# Patient Record
Sex: MALE | Race: WHITE | NOT HISPANIC OR LATINO | Employment: FULL TIME | URBAN - METROPOLITAN AREA
[De-identification: names, ages, dates, MRNs, and addresses within clinical notes are randomized per-mention and may not be internally consistent; named-entity substitution may affect disease eponyms.]

---

## 2024-10-11 ENCOUNTER — ANESTHESIA EVENT (OUTPATIENT)
Dept: PERIOP | Facility: HOSPITAL | Age: 69
End: 2024-10-11
Payer: SELF-PAY

## 2024-10-11 RX ORDER — AMLODIPINE BESYLATE 5 MG/1
5 TABLET ORAL DAILY
COMMUNITY
Start: 2024-04-29 | End: 2025-04-29

## 2024-10-11 RX ORDER — TIRZEPATIDE 2.5 MG/.5ML
INJECTION, SOLUTION SUBCUTANEOUS
COMMUNITY
Start: 2024-10-04 | End: 2024-10-22

## 2024-10-11 RX ORDER — BUPRENORPHINE HYDROCHLORIDE AND NALOXONE HYDROCHLORIDE 5.7; 1.4 MG/1; MG/1
TABLET, ORALLY DISINTEGRATING SUBLINGUAL
COMMUNITY

## 2024-10-11 RX ORDER — RAMIPRIL 10 MG/1
1 CAPSULE ORAL EVERY 24 HOURS
COMMUNITY

## 2024-10-11 NOTE — PRE-PROCEDURE INSTRUCTIONS
Pre-Surgery Instructions:   Medication Instructions    amLODIPine (NORVASC) 5 mg tablet Take day of surgery.    apixaban (ELIQUIS) 5 mg Instructions provided by MD- pt did not receive instructions, will reach out to surgeon's office to see if needs to be held    ramipril (ALTACE) 10 MG capsule Hold day of surgery.    Zepbound 2.5 MG/0.5ML auto-injector Stop taking 7 days prior to surgery.    Zubsolv 5.7-1.4 MG SUBL Pt states he was advised by prescriber to hold 1 day prior to surgery.   Medication instructions for day surgery reviewed. Please use only a sip of water to take your instructed medications. Avoid all over the counter vitamins, supplements and NSAIDS for one week prior to surgery per anesthesia guidelines. Tylenol is ok to take as needed.     You will receive a call one business day prior to surgery with an arrival time and hospital directions. If your surgery is scheduled on a Monday, the hospital will be calling you on the Friday prior to your surgery. If you have not heard from anyone by 8pm, please call the hospital supervisor through the hospital  at [phone]. ([place] [phone] or [place] [phone]).    Do not eat or drink anything after midnight the night before your surgery, including candy, mints, lifesavers, or chewing gum. Do not drink alcohol 24hrs before your surgery. Try not to smoke at least 24hrs before your surgery.       Follow the pre surgery showering instructions as listed in the “My Surgical Experience Booklet” or otherwise provided by your surgeon's office. Do not use a blade to shave the surgical area 1 week before surgery. It is okay to use a clean electric clippers up to 24 hours before surgery. Do not apply any lotions, creams, including makeup, cologne, deodorant, or perfumes after showering on the day of your surgery. Do not use dry shampoo, hair spray, hair gel, or any type of hair products.     No contact lenses, eye make-up, or artificial eyelashes.  Remove nail polish, including gel polish, and any artificial, gel, or acrylic nails if possible. Remove all jewelry including rings and body piercing jewelry.     Wear causal clothing that is easy to take on and off. Consider your type of surgery.    Keep any valuables, jewelry, piercings at home. Please bring any specially ordered equipment (sling, braces) if indicated.    Arrange for a responsible person to drive you to and from the hospital on the day of your surgery. Please confirm the visitor policy for the day of your procedure when you receive your phone call with an arrival time.     Call the surgeon's office with any new illnesses, exposures, or additional questions prior to surgery.    Please reference your “My Surgical Experience Booklet” for additional information to prepare for your upcoming surgery.

## 2024-10-21 PROBLEM — H02.30: Status: ACTIVE | Noted: 2024-10-21

## 2024-10-21 NOTE — H&P
H&P - Plastic Surgery   Name: Joshua Orozco 69 y.o. male I MRN: [id number]  Unit/Bed#:  I Date of Admission: (Not on file)   Date of Service: 10/21/2024 I Hospital Day: 0     Assessment & Plan  Blepharochalasis of upper and lower eyelids  Bilateral upper eyelid blepharoplasty bilateral lower eyelid direct excision of festoons    History of Present Illness   Joshua Orozco is a 69 y.o. male who presents with excessive eyelid skin waiting down on the eye lashes causing a visual field obstruction.  Visual field study showed there was more than 30 degree superior field of vision deficit.  Patient also has excessive lower eyelid skin with prominent festoons which will be excised during blepharoplasty    Review of Systems   All other systems reviewed and are negative.    Historical Information   I have reviewed the patient's PMH, PSH, Social History, Family History, Meds, and Allergies    Objective :       Physical Exam  Examination of the head ears eyes nose and throat is within normal limits heart sounds S1-S2 heard no murmurs or gallops lungs clear abdomen soft extremities are within normal limits bilateral upper eyelids show excessive upper eyelid skin waiting down on the eyelashes bilateral lower eyelids show excessive skin with a prominent festoons    Lab Results: I have reviewed the following results:    Imaging Results Review: No pertinent imaging studies reviewed.      VTE Prophylaxis: Sequential compression device (Venodyne)

## 2024-10-22 ENCOUNTER — HOSPITAL ENCOUNTER (OUTPATIENT)
Facility: HOSPITAL | Age: 69
Setting detail: OUTPATIENT SURGERY
Discharge: HOME | End: 2024-10-22
Attending: PLASTIC SURGERY | Admitting: PLASTIC SURGERY
Payer: SELF-PAY

## 2024-10-22 ENCOUNTER — ANESTHESIA (OUTPATIENT)
Dept: PERIOP | Facility: HOSPITAL | Age: 69
End: 2024-10-22
Payer: SELF-PAY

## 2024-10-22 VITALS
BODY MASS INDEX: 34.36 KG/M2 | TEMPERATURE: 97.1 F | HEART RATE: 68 BPM | OXYGEN SATURATION: 97 % | WEIGHT: 240 LBS | SYSTOLIC BLOOD PRESSURE: 140 MMHG | RESPIRATION RATE: 18 BRPM | HEIGHT: 70 IN | DIASTOLIC BLOOD PRESSURE: 85 MMHG

## 2024-10-22 DIAGNOSIS — H02.30: Primary | ICD-10-CM

## 2024-10-22 RX ORDER — PROPOFOL 10 MG/ML
INJECTION, EMULSION INTRAVENOUS AS NEEDED
Status: DISCONTINUED | OUTPATIENT
Start: 2024-10-22 | End: 2024-10-22

## 2024-10-22 RX ORDER — GLYCOPYRROLATE 0.2 MG/ML
INJECTION INTRAMUSCULAR; INTRAVENOUS AS NEEDED
Status: DISCONTINUED | OUTPATIENT
Start: 2024-10-22 | End: 2024-10-22

## 2024-10-22 RX ORDER — FENTANYL CITRATE/PF 50 MCG/ML
25 SYRINGE (ML) INJECTION
Status: DISCONTINUED | OUTPATIENT
Start: 2024-10-22 | End: 2024-10-22 | Stop reason: HOSPADM

## 2024-10-22 RX ORDER — MAGNESIUM HYDROXIDE 1200 MG/15ML
LIQUID ORAL AS NEEDED
Status: DISCONTINUED | OUTPATIENT
Start: 2024-10-22 | End: 2024-10-22 | Stop reason: HOSPADM

## 2024-10-22 RX ORDER — ONDANSETRON 2 MG/ML
4 INJECTION INTRAMUSCULAR; INTRAVENOUS ONCE AS NEEDED
Status: DISCONTINUED | OUTPATIENT
Start: 2024-10-22 | End: 2024-10-22 | Stop reason: HOSPADM

## 2024-10-22 RX ORDER — ACETAMINOPHEN 10 MG/ML
1000 INJECTION, SOLUTION INTRAVENOUS EVERY 6 HOURS PRN
Status: DISCONTINUED | OUTPATIENT
Start: 2024-10-22 | End: 2024-10-22 | Stop reason: HOSPADM

## 2024-10-22 RX ORDER — FENTANYL CITRATE 50 UG/ML
INJECTION, SOLUTION INTRAMUSCULAR; INTRAVENOUS AS NEEDED
Status: DISCONTINUED | OUTPATIENT
Start: 2024-10-22 | End: 2024-10-22

## 2024-10-22 RX ORDER — SODIUM CHLORIDE, SODIUM LACTATE, POTASSIUM CHLORIDE, CALCIUM CHLORIDE 600; 310; 30; 20 MG/100ML; MG/100ML; MG/100ML; MG/100ML
125 INJECTION, SOLUTION INTRAVENOUS CONTINUOUS
Status: DISCONTINUED | OUTPATIENT
Start: 2024-10-22 | End: 2024-10-22 | Stop reason: HOSPADM

## 2024-10-22 RX ORDER — CEFAZOLIN SODIUM 1 G/50ML
1000 SOLUTION INTRAVENOUS ONCE
Status: COMPLETED | OUTPATIENT
Start: 2024-10-22 | End: 2024-10-22

## 2024-10-22 RX ORDER — BALANCED SALT SOLUTION 6.4; .75; .48; .3; 3.9; 1.7 MG/ML; MG/ML; MG/ML; MG/ML; MG/ML; MG/ML
SOLUTION OPHTHALMIC AS NEEDED
Status: DISCONTINUED | OUTPATIENT
Start: 2024-10-22 | End: 2024-10-22 | Stop reason: HOSPADM

## 2024-10-22 RX ORDER — PHENYLEPHRINE HCL IN 0.9% NACL 1 MG/10 ML
SYRINGE (ML) INTRAVENOUS AS NEEDED
Status: DISCONTINUED | OUTPATIENT
Start: 2024-10-22 | End: 2024-10-22

## 2024-10-22 RX ORDER — CEPHALEXIN 500 MG/1
500 CAPSULE ORAL EVERY 8 HOURS SCHEDULED
Qty: 7 CAPSULE | Refills: 0 | Status: SHIPPED | OUTPATIENT
Start: 2024-10-22 | End: 2024-10-29

## 2024-10-22 RX ORDER — EPHEDRINE SULFATE 50 MG/ML
INJECTION INTRAVENOUS AS NEEDED
Status: DISCONTINUED | OUTPATIENT
Start: 2024-10-22 | End: 2024-10-22

## 2024-10-22 RX ORDER — LIDOCAINE HYDROCHLORIDE AND EPINEPHRINE 10; 10 MG/ML; UG/ML
INJECTION, SOLUTION INFILTRATION; PERINEURAL AS NEEDED
Status: DISCONTINUED | OUTPATIENT
Start: 2024-10-22 | End: 2024-10-22 | Stop reason: HOSPADM

## 2024-10-22 RX ORDER — NEOMYCIN SULFATE, POLYMYXIN B SULFATE, AND DEXAMETHASONE 3.5; 10000; 1 MG/G; [USP'U]/G; MG/G
OINTMENT OPHTHALMIC AS NEEDED
Status: DISCONTINUED | OUTPATIENT
Start: 2024-10-22 | End: 2024-10-22 | Stop reason: HOSPADM

## 2024-10-22 RX ORDER — LIDOCAINE HYDROCHLORIDE 10 MG/ML
INJECTION, SOLUTION EPIDURAL; INFILTRATION; INTRACAUDAL; PERINEURAL AS NEEDED
Status: DISCONTINUED | OUTPATIENT
Start: 2024-10-22 | End: 2024-10-22

## 2024-10-22 RX ORDER — TRAMADOL HYDROCHLORIDE 50 MG/1
50 TABLET ORAL EVERY 6 HOURS PRN
Qty: 20 TABLET | Refills: 0 | Status: SHIPPED | OUTPATIENT
Start: 2024-10-22

## 2024-10-22 RX ORDER — ONDANSETRON 2 MG/ML
INJECTION INTRAMUSCULAR; INTRAVENOUS AS NEEDED
Status: DISCONTINUED | OUTPATIENT
Start: 2024-10-22 | End: 2024-10-22

## 2024-10-22 RX ORDER — TRAMADOL HYDROCHLORIDE 50 MG/1
50 TABLET ORAL EVERY 6 HOURS PRN
Status: DISCONTINUED | OUTPATIENT
Start: 2024-10-22 | End: 2024-10-22 | Stop reason: HOSPADM

## 2024-10-22 RX ADMIN — FENTANYL CITRATE 25 MCG: 50 INJECTION, SOLUTION INTRAMUSCULAR; INTRAVENOUS at 15:44

## 2024-10-22 RX ADMIN — Medication 100 MCG: at 16:18

## 2024-10-22 RX ADMIN — EPHEDRINE SULFATE 10 MG: 50 INJECTION INTRAVENOUS at 16:23

## 2024-10-22 RX ADMIN — FENTANYL CITRATE 50 MCG: 50 INJECTION, SOLUTION INTRAMUSCULAR; INTRAVENOUS at 15:48

## 2024-10-22 RX ADMIN — LIDOCAINE HYDROCHLORIDE 50 MG: 10 SOLUTION INTRAVENOUS at 15:10

## 2024-10-22 RX ADMIN — FENTANYL CITRATE 50 MCG: 50 INJECTION, SOLUTION INTRAMUSCULAR; INTRAVENOUS at 15:50

## 2024-10-22 RX ADMIN — ONDANSETRON 4 MG: 2 INJECTION INTRAMUSCULAR; INTRAVENOUS at 16:52

## 2024-10-22 RX ADMIN — FENTANYL CITRATE 25 MCG: 50 INJECTION, SOLUTION INTRAMUSCULAR; INTRAVENOUS at 17:53

## 2024-10-22 RX ADMIN — FENTANYL CITRATE 25 MCG: 50 INJECTION, SOLUTION INTRAMUSCULAR; INTRAVENOUS at 17:47

## 2024-10-22 RX ADMIN — CEFAZOLIN SODIUM 2000 MG: 1 SOLUTION INTRAVENOUS at 15:10

## 2024-10-22 RX ADMIN — PROPOFOL 200 MG: 10 INJECTION, EMULSION INTRAVENOUS at 15:10

## 2024-10-22 RX ADMIN — FENTANYL CITRATE 25 MCG: 50 INJECTION, SOLUTION INTRAMUSCULAR; INTRAVENOUS at 16:22

## 2024-10-22 RX ADMIN — FENTANYL CITRATE 25 MCG: 50 INJECTION, SOLUTION INTRAMUSCULAR; INTRAVENOUS at 17:50

## 2024-10-22 RX ADMIN — FENTANYL CITRATE 25 MCG: 50 INJECTION, SOLUTION INTRAMUSCULAR; INTRAVENOUS at 17:02

## 2024-10-22 RX ADMIN — SODIUM CHLORIDE, SODIUM LACTATE, POTASSIUM CHLORIDE, AND CALCIUM CHLORIDE: .6; .31; .03; .02 INJECTION, SOLUTION INTRAVENOUS at 15:07

## 2024-10-22 RX ADMIN — ACETAMINOPHEN 1000 MG: 10 INJECTION INTRAVENOUS at 17:36

## 2024-10-22 RX ADMIN — Medication 100 MCG: at 16:04

## 2024-10-22 RX ADMIN — FENTANYL CITRATE 25 MCG: 50 INJECTION, SOLUTION INTRAMUSCULAR; INTRAVENOUS at 17:39

## 2024-10-22 RX ADMIN — TRAMADOL HYDROCHLORIDE 50 MG: 50 TABLET, COATED ORAL at 18:28

## 2024-10-22 RX ADMIN — GLYCOPYRROLATE 0.2 MCG: 0.2 INJECTION, SOLUTION INTRAMUSCULAR; INTRAVENOUS at 15:34

## 2024-10-22 RX ADMIN — FENTANYL CITRATE 25 MCG: 50 INJECTION, SOLUTION INTRAMUSCULAR; INTRAVENOUS at 15:13

## 2024-10-22 NOTE — ANESTHESIA POSTPROCEDURE EVALUATION
Post-Op Assessment Note    CV Status:  Stable  Pain Score: 0    Pain management: adequate       Mental Status:  Alert and awake   Hydration Status:  Euvolemic   PONV Controlled:  Controlled   Airway Patency:  Patent     Post Op Vitals Reviewed: Yes    No anethesia notable event occurred.    Staff: CRNA           Last Filed PACU Vitals:  Vitals Value Taken Time   Temp 97.3 °F (36.3 °C) 10/22/24 1711   Pulse 81 10/22/24 1712   /70 10/22/24 1711   Resp 16 10/22/24 1711   SpO2 96 % 10/22/24 1712   Vitals shown include unfiled device data.    Modified Rom:  No data recorded

## 2024-10-22 NOTE — OP NOTE
OPERATIVE REPORT  PATIENT NAME: Joshua Orozco    :  1955  MRN: [id number]  Pt Location:  OR ROOM 07    SURGERY DATE: 10/22/2024    Surgeons and Role:     * Joshua Delgadillo MD - Primary    Preop Diagnosis:  Blepharochalasis right upper eyelid [H02.31]  Blepharochalasis left upper eyelid [H02.34]  Other localized visual field defect, bilateral [H53.453]  Dermatochalasis of right upper eyelid [H02.831]  Dermatochalasis of left upper eyelid [H02.834]    Post-Op Diagnosis Codes:     * Blepharochalasis right upper eyelid [H02.31]     * Blepharochalasis left upper eyelid [H02.34]     * Other localized visual field defect, bilateral [H53.453]     * Dermatochalasis of right upper eyelid [H02.831]     * Dermatochalasis of left upper eyelid [H02.834]    Procedure(s):  Bilateral - BLEPHAROPLASTY UPPER  Bilateral - BLEPHAROPLASTY LOWER    Specimen(s):  * No specimens in log *    Estimated Blood Loss:   Minimal    Drains:  * No LDAs found *    Anesthesia Type:   General/LMA    Operative Indications:  Blepharochalasis right upper eyelid [H02.31]  Blepharochalasis left upper eyelid [H02.34]  Other localized visual field defect, bilateral [H53.453]  Dermatochalasis of right upper eyelid [H02.831]  Dermatochalasis of left upper eyelid [H02.834]  Bilateral lower eyelid stones    Operative Findings:  Excessive upper eyelid skin and fat lower eyelid muscle and skin and fat excess      Complications:   None    Procedure and Technique:  Patient was draped and prepped in normal sterile fashion after general anesthesia.  The upper eyelids were marked for excision of excessive upper eyelid skin and the lower eyelids were marked for excision directly of festoons incision following the contour of the infraorbital ridge.  The eyes were injected with local anesthesia the excessive upper eyelid skin was removed a 3 mm strip of orbicularis oculi was removed the upper eyelids were defatted hemostasis achieved with electrocautery the skin  was next closed with a running 6-0 nylon suture.  An identical procedure was performed on both eyes next bilateral lower eyelid premarked area was excised the orbicularis oculi excess muscle was removed hemostasis achieved with electrocautery the wounds were irrigated and the muscle was then sutured with 6-0 Vicryl suture and then a running 6-0 nylon suture for skin and identical procedure was performed on both sides the eyes were flushed with balanced salt solution and Maxitrol ointment applied   I was present for the entire procedure.    Patient Disposition:  PACU              SIGNATURE: Joshua Delgadillo MD  DATE: October 22, 2024  TIME: 5:05 PM

## 2024-10-22 NOTE — DISCHARGE INSTR - AVS FIRST PAGE
THIS IS CONSIDERED MAJOR SURGERY. PLEASE ACT ACCORDINGLY. THE FOLLOWING INSTRUCTIONS ARE INTENDED AS A GUIDE FOR YOU TO FOLLOW AT HOME. THEY ARE NOT INTENDED AS A SUBSTITUTE FOR MEDICAL CARE.    *Bring your dark sunglasses to wear after your surgery*    24-48 HOURS AFTER SURGERY:    Following surgery, go directly home and remain quiet in bed for 24 hours. Only with assistance may you get up to use the bathroom. When lying or sleeping, please use several pillows to keep your head elevated.  FIll your prescriptions and take the medication as directed.  You may have food and beverages as desired and tolerated.  Apply cold compresses to your eyelids for at least 24 hours. The compresses may consist of gauze pads or a clean, lightweight washcloth soaked in ice water. Continue to re-apply iced gauze pads as those on the eyelids become warm after a period of time. These will help to reduce the swelling and discoloration. Never use hot or warm compresses!  Use No Tears Gaudencio Shampoo to wash your face and eyes.  Avoid smoking for 48 hours after your operation to prevent coughing and possible bleeding.  Nausea and vomiting are occasionally present after an anesthetic. If this should happen, do not panic. Please call the office so that I may prescribe something to make you feel more comfortable.  If there is excessive bleeding, excessive discoloration, excessive swelling, severe pain, a temperature of 101 degrees or more, or a significant change in vision (anything more than mild burning), please call the office.    FOLLOW UP CARE:    The morning of the second day will show the most swelling and discoloration. Some bleeding from the wound edges and blood shot eyes will also occur. Do not be alarmed as this is a normal occurrence.  Sutures will be removed seven days after the surgery. Try to avoid touching your eyelids so as to prevent any infection.     HEALING CARE:    You may resume wearing eye makeup two days after your  sutures are removed.   23 hours after suregery, you may take a short tepid shower and wash you hair with mild soap.  No alcohol is permitted while taking medications. Alcohol may prolong swelling so we advised that you avoid it for 1-2 weeks.  Tearing, burning, tightness, itching, tingling, puffiness, and red bumpy incision lines are all normal complaints and will go away with full recovery.  Avoid prolonged sun exposure for one month.    IF YOU HAVE ANY PROBLEMS OR QUESTIONS, PLEASE DO NOT HESITATE TO CALL THE OFFICE.    [phone]      [facility]

## 2024-10-22 NOTE — NURSING NOTE
Pain controlled with pain medication. Discharge instructions and supplies given for home. Ice packs, etc. Wife here to drive home

## 2024-10-22 NOTE — ANESTHESIA PREPROCEDURE EVALUATION
"Procedure:  BLEPHAROPLASTY UPPER (Bilateral: Eye)  BLEPHAROPLASTY LOWER (Bilateral: Eye)    Relevant Problems   CARDIO   (+) DVT (deep venous thrombosis) ([facility])   (+) Hypertension   (+) PAF (paroxysmal atrial fibrillation) ([facility])      Eye   (+) Blepharochalasis of upper and lower eyelids      Cardiovascular/Peripheral Vascular   (+) NSVT (nonsustained ventricular tachycardia) ([facility])      Other   (+) CPAP (continuous positive airway pressure) dependence        Physical Exam    Airway    Mallampati score: II  TM Distance: >3 FB  Neck ROM: full     Dental   No notable dental hx     Cardiovascular  Cardiovascular exam normal    Pulmonary  Pulmonary exam normal     Other Findings        Anesthesia Plan  ASA Score- 3     Anesthesia Type- general with ASA Monitors.         Additional Monitors:     Airway Plan: LMA.           Plan Factors-Exercise tolerance (METS): >4 METS.    Chart reviewed. EKG reviewed. Imaging results reviewed. Existing labs reviewed. Patient summary reviewed.    Patient is not a current smoker. Patient not instructed to abstain from smoking on day of procedure. Patient did not smoke on day of surgery.            Induction- intravenous.    Postoperative Plan- Plan for postoperative opioid use.     Perioperative Resuscitation Plan - Level 1 - Full Code.       Informed Consent- Anesthetic plan and risks discussed with patient.  I personally reviewed this patient with the CRNA. Discussed and agreed on the Anesthesia Plan with the CRNA..  VITALS  Ht 5' 10\" (1.778 m)   Wt 109 kg (240 lb)   BMI 34.44 kg/m²  BP Readings from Last 3 Encounters:   No data found for BP        LABS  No results for input(s): \"WBC\", \"HGB\", \"HCT\", \"PLT\" in the last 8784 hours.  No results for input(s): \"SODIUM\", \"K\", \"CL\", \"CO2\", \"AGAP\", \"BUN\", \"CREATININE\", \"CALCIUM\", \"GLUC\", \"CAION\", \"PHOS\", \"MG\", \"HGBA1C\", \"HBA1C\" in the last 8784 hours.  No results for input(s): \"APTT\", \"INR\", \"PTT\" in the last 8784 " hours.    ECG      ECHOCARDIOGRAPHY OR OTHER TESTING/IMAGING  No results found for this or any previous visit (from the past 4464 hour(s)).  No results found for this or any previous visit. Ablation  Summary   Successful ablation for Af targeting fractionated LA signals with isolation of YOLANDA   All 4 pulmonary veins were noted to be isolated   Posterior wall is isolated   No inducible arrhythmia post ablation         MAGO  Left Ventricle:    The left ventricle is grossly normal size. Left                      ventricular systolic function is normal. Estimated                      EF=55-60%. The left ventricular wall motion is normal.                      There is no thrombus.   Atria, left and    The left atrium is mildly dilated. No thrombus is   right:             detected in the left atrial appendage. The interatrial                      septum is intact with no evidence for an atrial septal                      defect. Right atrial size is normal.   Right Ventricle:   The right ventricle is normal in size and function.   Aortic Valve:      The aortic valve is trileaflet. There is no aortic                      stenosis. No aortic regurgitation is present.   Mitral Valve:      There is mild anterior and posterior mitral leaflet                      calcifications. There is mild mitral annular                      calcification. There is no mitral valve stenosis. There                      is mild mitral regurgitation.   Tricuspid Valve:   There is trace tricuspid regurgitation. There was                      insufficient TR detected to calculate RV systolic                      pressure.   Pulmonic Valve:    There is no pulmonic valvular regurgitation.      ------- ANESTHESIA RISK-BENEFIT DISCUSSION -------  BENEFITS OF A SPECIALIZED ANESTHESIA TEAM INCLUDE (NBK [id number], PMID 57523731):  (1) Reduce mortality and morbidity for major surgeries/procedures. (2) The team provides analgesia/sedation/amnesia/akinesia as  safely as possible. (3) The team strives to reduce discomfort as safely as possible.    RISKS, AND PLANS TO MITIGATE RISKS, INCLUDE:    - Neurologic system: IntraOp awareness (Risk is ~1:1,000 - 1:14,000; PMID 51163962), Stroke (Risk ~<0.1-2% for most cases; PMID 40809986), nerve injury, vision loss, and POCD.     - Airway and Pulmonary system: Dental or mouth injury, throat pain, critical hypoxia, pneumothorax, prolonged intubation, post-op respiratory compromise.  Airway/Intubation risks and prior data: No prior advanced airway notes in [facility] EMR  Major ARISCAT risk factors for pulmonary complications include: none, yielding a score of 0-1= Low risk, 1.6%.  - Cardiovascular system: Hypotension, arrhythmias, cardiac injury or arrest, blood clots, bleeding, infection, vascular injuries.  Jordan's RCRI score items: none, yielding an RCRI Score of 0= 0.4% risk of MACE  Are kris-op or intra-op beta blockers indicated? (PMID 83069554): no  - FEN/GI system: Aspiration risk (~0.5% per PMC 7306976) and PONV (10-80% per Apfel score) especially if the patient has not fasted.  ASA NPO guideline compliance?: Yes  - Medication risk assessment: Allergic reactions, excessive bleeding with anticoagulant use, overdoses, drug-drug interactions, injury to a fetus or  in pregnant or breastfeeding patients, kris-procedural sedation including while driving/operating machinery.  Recent relevant medications: See MAR or Med Review  Personal or family history of anesthesia complications: no  Pregnancy Status: N/A  - Estimate mortality risks associated with anesthesia based on ASA-PS (PMID 81223812): ASA-PS III: 1:3,500

## 2024-10-22 NOTE — ANESTHESIA POSTPROCEDURE EVALUATION
Post-Op Assessment Note    CV Status:  Stable  Pain Score: 3    Pain management: adequate       Mental Status:  Alert and awake   Hydration Status:  Stable   PONV Controlled:  None   Airway Patency:  Patent     Post Op Vitals Reviewed: Yes    No anethesia notable event occurred.    Staff: Anesthesiologist, with CRNAs           Last Filed PACU Vitals:  Vitals Value Taken Time   Temp 97.3 °F (36.3 °C) 10/22/24 1711   Pulse 70 10/22/24 1810   /85 10/22/24 1804   Resp 16 10/22/24 1809   SpO2 98 % 10/22/24 1810   Vitals shown include unfiled device data.    Modified Rom:  Activity: 2 (10/22/2024  5:11 PM)  Respiration: 2 (10/22/2024  5:11 PM)  Circulation: 2 (10/22/2024  5:11 PM)  Consciousness: 1 (10/22/2024  5:11 PM)  Oxygen Saturation: 1 (10/22/2024  5:11 PM)  Modified Rom Score: 8 (10/22/2024  5:11 PM)

## (undated) DEVICE — SYRINGE 30ML LL

## (undated) DEVICE — DRAPE FLUID WARMER (BIRD BATH)

## (undated) DEVICE — CRADLE EXTREMITY UNIVERSAL CONTOURED

## (undated) DEVICE — SOLUTION BOWL: Brand: KENDALL

## (undated) DEVICE — BASIC SINGLE BASIN-LF: Brand: MEDLINE INDUSTRIES, INC.

## (undated) DEVICE — ICE PACK EYE

## (undated) DEVICE — SUT VICRYL 6-0 P-1 18 IN J489G

## (undated) DEVICE — INTENDED FOR TISSUE SEPARATION, AND OTHER PROCEDURES THAT REQUIRE A SHARP SURGICAL BLADE TO PUNCTURE OR CUT.: Brand: BARD-PARKER ® SAFETYLOCK CARBON RIB-BACK BLADES

## (undated) DEVICE — TIBURON SPLIT SHEET: Brand: CONVERTORS

## (undated) DEVICE — SUT ETHILON 6-0 P-3 18 IN 1698G

## (undated) DEVICE — NEEDLE BLUNT 18 G X 1 1/2IN

## (undated) DEVICE — CABLE BIPOLAR DISP MEGADYNE

## (undated) DEVICE — BETHLEHEM UNIVERSAL OUTPATIENT: Brand: CARDINAL HEALTH

## (undated) DEVICE — NEEDLE 30 G X 1/2

## (undated) DEVICE — SYRINGE 10ML LL CONTROL TOP

## (undated) DEVICE — GAUZE SPONGES,16 PLY: Brand: CURITY

## (undated) DEVICE — STERILE POLYISOPRENE POWDER-FREE SURGICAL GLOVES: Brand: PROTEXIS

## (undated) DEVICE — SCD SEQUENTIAL COMPRESSION COMFORT SLEEVE MEDIUM KNEE LENGTH: Brand: KENDALL SCD

## (undated) DEVICE — TELFA NON-ADHERENT ABSORBENT DRESSING: Brand: TELFA